# Patient Record
Sex: FEMALE | HISPANIC OR LATINO | Employment: FULL TIME | URBAN - METROPOLITAN AREA
[De-identification: names, ages, dates, MRNs, and addresses within clinical notes are randomized per-mention and may not be internally consistent; named-entity substitution may affect disease eponyms.]

---

## 2018-06-19 ENCOUNTER — HOSPITAL ENCOUNTER (OUTPATIENT)
Facility: MEDICAL CENTER | Age: 48
End: 2018-06-19
Attending: PHYSICIAN ASSISTANT
Payer: COMMERCIAL

## 2018-06-19 ENCOUNTER — OFFICE VISIT (OUTPATIENT)
Dept: MEDICAL GROUP | Facility: MEDICAL CENTER | Age: 48
End: 2018-06-19
Payer: COMMERCIAL

## 2018-06-19 VITALS
OXYGEN SATURATION: 97 % | DIASTOLIC BLOOD PRESSURE: 88 MMHG | HEIGHT: 59 IN | TEMPERATURE: 97.9 F | SYSTOLIC BLOOD PRESSURE: 140 MMHG | WEIGHT: 164.02 LBS | BODY MASS INDEX: 33.07 KG/M2 | HEART RATE: 77 BPM

## 2018-06-19 DIAGNOSIS — R31.0 GROSS HEMATURIA: ICD-10-CM

## 2018-06-19 DIAGNOSIS — E66.9 OBESITY (BMI 30-39.9): ICD-10-CM

## 2018-06-19 DIAGNOSIS — Z12.11 ENCOUNTER FOR FIT (FECAL IMMUNOCHEMICAL TEST) SCREENING: ICD-10-CM

## 2018-06-19 DIAGNOSIS — Z00.00 PREVENTATIVE HEALTH CARE: ICD-10-CM

## 2018-06-19 DIAGNOSIS — Z76.89 ENCOUNTER TO ESTABLISH CARE: ICD-10-CM

## 2018-06-19 LAB
APPEARANCE UR: CLEAR
BILIRUB UR STRIP-MCNC: NEGATIVE MG/DL
COLOR UR AUTO: YELLOW
GLUCOSE UR STRIP.AUTO-MCNC: NEGATIVE MG/DL
KETONES UR STRIP.AUTO-MCNC: NEGATIVE MG/DL
LEUKOCYTE ESTERASE UR QL STRIP.AUTO: 1
NITRITE UR QL STRIP.AUTO: NEGATIVE
PH UR STRIP.AUTO: 6 [PH] (ref 5–8)
PROT UR QL STRIP: NEGATIVE MG/DL
RBC UR QL AUTO: NEGATIVE
SP GR UR STRIP.AUTO: 1.02
UROBILINOGEN UR STRIP-MCNC: NEGATIVE MG/DL

## 2018-06-19 PROCEDURE — 87086 URINE CULTURE/COLONY COUNT: CPT

## 2018-06-19 PROCEDURE — 99204 OFFICE O/P NEW MOD 45 MIN: CPT | Performed by: PHYSICIAN ASSISTANT

## 2018-06-19 PROCEDURE — 81002 URINALYSIS NONAUTO W/O SCOPE: CPT | Performed by: PHYSICIAN ASSISTANT

## 2018-06-19 ASSESSMENT — PATIENT HEALTH QUESTIONNAIRE - PHQ9: CLINICAL INTERPRETATION OF PHQ2 SCORE: 0

## 2018-06-19 NOTE — PROGRESS NOTES
"Subjective:   Eugenia Byrd is a 48 y.o. female here today for gross hematuria ×2 times over the past 3 months.    Gross hematuria  This is a 48-year-old female who is sent here today by gastroenterology for having an abnormal urinalysis. Urine showed blood. Microscopic. Initially she saw GI for abdominal discomfort. She does complain of yomi blood in her urine twice over the past 3 months. Denies any dysuria or fevers or back pain. No history of smoking. Female organs are intact. Denies any weight loss. Denies any pelvic pain.       Current medicines (including changes today)  No current outpatient prescriptions on file.     No current facility-administered medications for this visit.      She  has no past medical history on file.    Social History and Family History were reviewed and updated.    ROS   No chest pain, no shortness of breath, no pelvic pain and all other systems were reviewed and are negative.       Objective:     Blood pressure 140/88, pulse 77, temperature 36.6 °C (97.9 °F), height 1.499 m (4' 11\"), weight 74.4 kg (164 lb 0.4 oz), SpO2 97 %. Body mass index is 33.13 kg/m².   Physical Exam:  Constitutional: Alert, no distress.  Skin: Warm, dry, good turgor, no rashes in visible areas.  Eye: Equal, round and reactive, conjunctiva clear, lids normal.  ENMT: Lips without lesions, good dentition, oropharynx clear.  Neck: Trachea midline, no masses.   Lymph: No cervical or supraclavicular lymphadenopathy  Respiratory: Unlabored respiratory effort, lungs clear to auscultation, no wheezes, no ronchi.  Cardiovascular: Normal S1, S2, no murmur, no edema.  Abdomen/pelvis: Soft, non-tender, no masses. No CVA tenderness.  Psych: Alert and oriented x3, normal affect and mood.        Assessment and Plan:   The following treatment plan was discussed    1. Gross hematuria  Acute, new onset condition. Unknown etiology. No risk factors for bladder cancer. Referred to urology to establish care. Urinalysis with 1+ " leukocytes. Negative for microscopic blood. We'll send off urine for culture. Advised to follow up in 2 months. At that point may have to do a Pap smear needed.  - POCT Urinalysis  - REFERRAL TO UROLOGY  - URINE CULTURE(NEW); Future    2. Obesity (BMI 30-39.9)  Chronic condition. We'll continue to monitor.  - Patient identified as having weight management issue.  Appropriate orders and counseling given.    3. Encounter for FIT (fecal immunochemical test) screening  Recent negative FIT testing. Had a colonoscopy in June 2010 which was within normal limits.    4. Preventative health care  Ordered labs fasting. She'll be contacted with the results.  - LIPID PROFILE; Future  - COMP METABOLIC PANEL; Future  - HEMOGLOBIN A1C; Future    5. Encounter to establish care      Followup: Return in about 2 months (around 8/19/2018), or if symptoms worsen or fail to improve.    Please note that this dictation was created using voice recognition software. I have made every reasonable attempt to correct obvious errors, but I expect that there are errors of grammar and possibly content that I did not discover before finalizing the note.

## 2018-06-19 NOTE — ASSESSMENT & PLAN NOTE
This is a 48-year-old female who is sent here today by gastroenterology for having an abnormal urinalysis. Urine showed blood. Microscopic. Initially she saw GI for abdominal discomfort. She does complain of yomi blood in her urine twice over the past 3 months. Denies any dysuria or fevers or back pain. No history of smoking. Female organs are intact. Denies any weight loss. Denies any pelvic pain.

## 2018-06-20 DIAGNOSIS — R31.0 GROSS HEMATURIA: ICD-10-CM

## 2018-06-22 ENCOUNTER — TELEPHONE (OUTPATIENT)
Dept: MEDICAL GROUP | Facility: MEDICAL CENTER | Age: 48
End: 2018-06-22

## 2018-06-22 LAB
BACTERIA UR CULT: NORMAL
SIGNIFICANT IND 70042: NORMAL
SITE SITE: NORMAL
SOURCE SOURCE: NORMAL

## 2018-06-22 NOTE — TELEPHONE ENCOUNTER
Phone Number Called: [phone] (home)      Message: Left message for patient to return our call so we can go over her lab results.     Left Message for patient to call back: yes

## 2018-06-22 NOTE — TELEPHONE ENCOUNTER
----- Message from Lior Colon P.A.-C. sent at 6/22/2018  7:04 AM PDT -----  Please contact. Urine did not show any bacterial infection. Follow-up with urology. Thank you.    Lior

## 2018-06-25 NOTE — TELEPHONE ENCOUNTER
Phone Number Called: [phone] (home)      Message: Left message for patient to call us back for lab results.     Left Message for patient to call back: yes

## 2018-06-27 NOTE — TELEPHONE ENCOUNTER
Phone Number Called: [phone] (home)      Message: Left message for patient to call us back for lab results.     Left Message for patient to call back: yes

## 2018-06-29 ENCOUNTER — TELEPHONE (OUTPATIENT)
Dept: MEDICAL GROUP | Facility: MEDICAL CENTER | Age: 48
End: 2018-06-29

## 2018-06-29 NOTE — TELEPHONE ENCOUNTER
1. Caller Name: Eugenia Chua                                         Call Back Number: [phone]      Patient approves a detailed voicemail message: yes    Pt called the office to report Urology has not received the referral as of yet. Notified Pt I would print her referral, face sheet and office visit and fax to [facility] now. Pt thankful for the help.

## 2018-06-30 ENCOUNTER — HOSPITAL ENCOUNTER (OUTPATIENT)
Dept: LAB | Facility: MEDICAL CENTER | Age: 48
End: 2018-06-30
Attending: PHYSICIAN ASSISTANT
Payer: COMMERCIAL

## 2018-06-30 DIAGNOSIS — Z00.00 PREVENTATIVE HEALTH CARE: ICD-10-CM

## 2018-06-30 LAB
ALBUMIN SERPL BCP-MCNC: 4 G/DL (ref 3.2–4.9)
ALBUMIN/GLOB SERPL: 1.3 G/DL
ALP SERPL-CCNC: 57 U/L (ref 30–99)
ALT SERPL-CCNC: 23 U/L (ref 2–50)
ANION GAP SERPL CALC-SCNC: 8 MMOL/L (ref 0–11.9)
AST SERPL-CCNC: 15 U/L (ref 12–45)
BILIRUB SERPL-MCNC: 0.4 MG/DL (ref 0.1–1.5)
BUN SERPL-MCNC: 16 MG/DL (ref 8–22)
CALCIUM SERPL-MCNC: 9 MG/DL (ref 8.5–10.5)
CHLORIDE SERPL-SCNC: 108 MMOL/L (ref 96–112)
CHOLEST SERPL-MCNC: 212 MG/DL (ref 100–199)
CO2 SERPL-SCNC: 25 MMOL/L (ref 20–33)
CREAT SERPL-MCNC: 0.5 MG/DL (ref 0.5–1.4)
EST. AVERAGE GLUCOSE BLD GHB EST-MCNC: 117 MG/DL
GLOBULIN SER CALC-MCNC: 3.2 G/DL (ref 1.9–3.5)
GLUCOSE SERPL-MCNC: 91 MG/DL (ref 65–99)
HBA1C MFR BLD: 5.7 % (ref 0–5.6)
HDLC SERPL-MCNC: 47 MG/DL
LDLC SERPL CALC-MCNC: 136 MG/DL
POTASSIUM SERPL-SCNC: 4 MMOL/L (ref 3.6–5.5)
PROT SERPL-MCNC: 7.2 G/DL (ref 6–8.2)
SODIUM SERPL-SCNC: 141 MMOL/L (ref 135–145)
TRIGL SERPL-MCNC: 145 MG/DL (ref 0–149)

## 2018-06-30 PROCEDURE — 80053 COMPREHEN METABOLIC PANEL: CPT

## 2018-06-30 PROCEDURE — 36415 COLL VENOUS BLD VENIPUNCTURE: CPT

## 2018-06-30 PROCEDURE — 83036 HEMOGLOBIN GLYCOSYLATED A1C: CPT

## 2018-06-30 PROCEDURE — 80061 LIPID PANEL: CPT

## 2018-07-01 PROBLEM — E78.00 PURE HYPERCHOLESTEROLEMIA: Status: ACTIVE | Noted: 2018-07-01

## 2018-07-02 ENCOUNTER — TELEPHONE (OUTPATIENT)
Dept: MEDICAL GROUP | Facility: MEDICAL CENTER | Age: 48
End: 2018-07-02

## 2018-07-02 NOTE — TELEPHONE ENCOUNTER
Phone Number Called: [phone] (home)       Message: Left msg for patient to call back in regards to labs.     Left Message for patient to call back: Yes

## 2018-07-02 NOTE — TELEPHONE ENCOUNTER
----- Message from Lior Colon P.A.-C. sent at 7/1/2018  3:29 PM PDT -----  Please contact Eugenia.  Only concern with labs were her cholesterol levels.  They are elevated.  LDL at 136 and should be below 100.  Continue to exercise routinely and watch fatty foods.  I'll see her in August to discuss repeating those labs or medication initiation.  Thank you.    Lior

## 2018-07-03 NOTE — TELEPHONE ENCOUNTER
Phone Number Called: [phone] (home)       Message: Left msg for patient to call back for labs     Left Message for patient to call back: yes

## 2018-07-05 NOTE — TELEPHONE ENCOUNTER
Phone Number Called: [phone] (home)       Message: Left vm for patient to call back. Will be mailing out letter today.     Left Message for patient to call back: yes

## 2018-08-17 ENCOUNTER — APPOINTMENT (OUTPATIENT)
Dept: MEDICAL GROUP | Facility: MEDICAL CENTER | Age: 48
End: 2018-08-17
Payer: COMMERCIAL

## 2018-08-24 ENCOUNTER — HOSPITAL ENCOUNTER (OUTPATIENT)
Dept: RADIOLOGY | Facility: MEDICAL CENTER | Age: 48
End: 2018-08-24

## 2018-10-17 ENCOUNTER — HOSPITAL ENCOUNTER (OUTPATIENT)
Facility: MEDICAL CENTER | Age: 48
End: 2018-10-17
Attending: UROLOGY | Admitting: UROLOGY
Payer: COMMERCIAL

## 2018-10-17 ENCOUNTER — APPOINTMENT (OUTPATIENT)
Dept: RADIOLOGY | Facility: MEDICAL CENTER | Age: 48
End: 2018-10-17
Attending: UROLOGY
Payer: COMMERCIAL

## 2018-10-17 VITALS
WEIGHT: 163.8 LBS | HEART RATE: 94 BPM | BODY MASS INDEX: 32.16 KG/M2 | TEMPERATURE: 98.1 F | OXYGEN SATURATION: 95 % | RESPIRATION RATE: 14 BRPM | SYSTOLIC BLOOD PRESSURE: 140 MMHG | HEIGHT: 60 IN | DIASTOLIC BLOOD PRESSURE: 79 MMHG

## 2018-10-17 DIAGNOSIS — N20.0 CALCULUS OF KIDNEY: ICD-10-CM

## 2018-10-17 LAB
ANION GAP SERPL CALC-SCNC: 9 MMOL/L (ref 0–11.9)
APPEARANCE UR: CLEAR
BACTERIA #/AREA URNS HPF: ABNORMAL /HPF
BILIRUB UR QL STRIP.AUTO: NEGATIVE
BUN SERPL-MCNC: 8 MG/DL (ref 8–22)
CALCIUM SERPL-MCNC: 9.8 MG/DL (ref 8.5–10.5)
CHLORIDE SERPL-SCNC: 107 MMOL/L (ref 96–112)
CO2 SERPL-SCNC: 26 MMOL/L (ref 20–33)
COLOR UR: YELLOW
CREAT SERPL-MCNC: 0.55 MG/DL (ref 0.5–1.4)
EPI CELLS #/AREA URNS HPF: ABNORMAL /HPF
ERYTHROCYTE [DISTWIDTH] IN BLOOD BY AUTOMATED COUNT: 42.9 FL (ref 35.9–50)
GLUCOSE SERPL-MCNC: 107 MG/DL (ref 65–99)
GLUCOSE UR STRIP.AUTO-MCNC: NEGATIVE MG/DL
HCG UR QL: NEGATIVE
HCT VFR BLD AUTO: 43.4 % (ref 37–47)
HGB BLD-MCNC: 14.3 G/DL (ref 12–16)
INR PPP: 0.88 (ref 0.87–1.13)
KETONES UR STRIP.AUTO-MCNC: NEGATIVE MG/DL
LEUKOCYTE ESTERASE UR QL STRIP.AUTO: ABNORMAL
MCH RBC QN AUTO: 28.4 PG (ref 27–33)
MCHC RBC AUTO-ENTMCNC: 32.9 G/DL (ref 33.6–35)
MCV RBC AUTO: 86.1 FL (ref 81.4–97.8)
MICRO URNS: ABNORMAL
MUCOUS THREADS #/AREA URNS HPF: ABNORMAL /HPF
NITRITE UR QL STRIP.AUTO: NEGATIVE
PH UR STRIP.AUTO: 7 [PH]
PLATELET # BLD AUTO: 312 K/UL (ref 164–446)
PMV BLD AUTO: 11.5 FL (ref 9–12.9)
POTASSIUM SERPL-SCNC: 3.5 MMOL/L (ref 3.6–5.5)
PROT UR QL STRIP: 30 MG/DL
PROTHROMBIN TIME: 12 SEC (ref 12–14.6)
RBC # BLD AUTO: 5.04 M/UL (ref 4.2–5.4)
RBC # URNS HPF: ABNORMAL /HPF
RBC UR QL AUTO: ABNORMAL
SODIUM SERPL-SCNC: 142 MMOL/L (ref 135–145)
SP GR UR REFRACTOMETRY: 1.02
SP GR UR STRIP.AUTO: 1.02
UROBILINOGEN UR STRIP.AUTO-MCNC: 0.2 MG/DL
WBC # BLD AUTO: 11.9 K/UL (ref 4.8–10.8)
WBC #/AREA URNS HPF: ABNORMAL /HPF

## 2018-10-17 PROCEDURE — C1769 GUIDE WIRE: HCPCS | Performed by: UROLOGY

## 2018-10-17 PROCEDURE — 87086 URINE CULTURE/COLONY COUNT: CPT

## 2018-10-17 PROCEDURE — 87186 SC STD MICRODIL/AGAR DIL: CPT

## 2018-10-17 PROCEDURE — 160035 HCHG PACU - 1ST 60 MINS PHASE I: Performed by: UROLOGY

## 2018-10-17 PROCEDURE — 160048 HCHG OR STATISTICAL LEVEL 1-5: Performed by: UROLOGY

## 2018-10-17 PROCEDURE — A9270 NON-COVERED ITEM OR SERVICE: HCPCS | Performed by: ANESTHESIOLOGY

## 2018-10-17 PROCEDURE — 700111 HCHG RX REV CODE 636 W/ 250 OVERRIDE (IP): Performed by: ANESTHESIOLOGY

## 2018-10-17 PROCEDURE — 160046 HCHG PACU - 1ST 60 MINS PHASE II: Performed by: UROLOGY

## 2018-10-17 PROCEDURE — 81001 URINALYSIS AUTO W/SCOPE: CPT

## 2018-10-17 PROCEDURE — 160029 HCHG SURGERY MINUTES - 1ST 30 MINS LEVEL 4: Performed by: UROLOGY

## 2018-10-17 PROCEDURE — 160041 HCHG SURGERY MINUTES - EA ADDL 1 MIN LEVEL 4: Performed by: UROLOGY

## 2018-10-17 PROCEDURE — 160025 RECOVERY II MINUTES (STATS): Performed by: UROLOGY

## 2018-10-17 PROCEDURE — C1758 CATHETER, URETERAL: HCPCS | Performed by: UROLOGY

## 2018-10-17 PROCEDURE — 500440 HCHG DRESSING, STERILE ROLL (KERLIX): Performed by: UROLOGY

## 2018-10-17 PROCEDURE — 500191 HCHG CATH, COUNCIL TIP 24FR (NEPH): Performed by: UROLOGY

## 2018-10-17 PROCEDURE — 99153 MOD SED SAME PHYS/QHP EA: CPT

## 2018-10-17 PROCEDURE — 700101 HCHG RX REV CODE 250

## 2018-10-17 PROCEDURE — 501161 HCHG PROBE, SWISS LITHOCLAST: Performed by: UROLOGY

## 2018-10-17 PROCEDURE — 80048 BASIC METABOLIC PNL TOTAL CA: CPT

## 2018-10-17 PROCEDURE — 501329 HCHG SET, CYSTO IRRIG Y TUR: Performed by: UROLOGY

## 2018-10-17 PROCEDURE — 88300 SURGICAL PATH GROSS: CPT

## 2018-10-17 PROCEDURE — C1729 CATH, DRAINAGE: HCPCS | Performed by: UROLOGY

## 2018-10-17 PROCEDURE — 700102 HCHG RX REV CODE 250 W/ 637 OVERRIDE(OP): Performed by: ANESTHESIOLOGY

## 2018-10-17 PROCEDURE — 160009 HCHG ANES TIME/MIN: Performed by: UROLOGY

## 2018-10-17 PROCEDURE — 700111 HCHG RX REV CODE 636 W/ 250 OVERRIDE (IP): Performed by: UROLOGY

## 2018-10-17 PROCEDURE — 81025 URINE PREGNANCY TEST: CPT

## 2018-10-17 PROCEDURE — 160002 HCHG RECOVERY MINUTES (STAT)

## 2018-10-17 PROCEDURE — 500042 HCHG BAG, URINARY DRAINAGE (CLOSED): Performed by: UROLOGY

## 2018-10-17 PROCEDURE — 700111 HCHG RX REV CODE 636 W/ 250 OVERRIDE (IP)

## 2018-10-17 PROCEDURE — 85027 COMPLETE CBC AUTOMATED: CPT

## 2018-10-17 PROCEDURE — 501838 HCHG SUTURE GENERAL: Performed by: UROLOGY

## 2018-10-17 PROCEDURE — 700111 HCHG RX REV CODE 636 W/ 250 OVERRIDE (IP): Performed by: RADIOLOGY

## 2018-10-17 PROCEDURE — 87077 CULTURE AEROBIC IDENTIFY: CPT

## 2018-10-17 PROCEDURE — 160002 HCHG RECOVERY MINUTES (STAT): Performed by: UROLOGY

## 2018-10-17 PROCEDURE — 160036 HCHG PACU - EA ADDL 30 MINS PHASE I: Performed by: UROLOGY

## 2018-10-17 PROCEDURE — 85610 PROTHROMBIN TIME: CPT

## 2018-10-17 PROCEDURE — 82365 CALCULUS SPECTROSCOPY: CPT

## 2018-10-17 RX ORDER — CEFAZOLIN SODIUM 1 G/3ML
INJECTION, POWDER, FOR SOLUTION INTRAMUSCULAR; INTRAVENOUS
Status: COMPLETED
Start: 2018-10-17 | End: 2018-10-17

## 2018-10-17 RX ORDER — SODIUM CHLORIDE 9 MG/ML
500 INJECTION, SOLUTION INTRAVENOUS
Status: DISCONTINUED | OUTPATIENT
Start: 2018-10-17 | End: 2018-10-17 | Stop reason: HOSPADM

## 2018-10-17 RX ORDER — DIPHENHYDRAMINE HYDROCHLORIDE 50 MG/ML
12.5 INJECTION INTRAMUSCULAR; INTRAVENOUS
Status: DISCONTINUED | OUTPATIENT
Start: 2018-10-17 | End: 2018-10-17 | Stop reason: HOSPADM

## 2018-10-17 RX ORDER — MIDAZOLAM HYDROCHLORIDE 1 MG/ML
INJECTION INTRAMUSCULAR; INTRAVENOUS
Status: COMPLETED
Start: 2018-10-17 | End: 2018-10-17

## 2018-10-17 RX ORDER — LABETALOL HYDROCHLORIDE 5 MG/ML
5 INJECTION, SOLUTION INTRAVENOUS
Status: DISCONTINUED | OUTPATIENT
Start: 2018-10-17 | End: 2018-10-17 | Stop reason: HOSPADM

## 2018-10-17 RX ORDER — HYDROMORPHONE HYDROCHLORIDE 2 MG/ML
INJECTION, SOLUTION INTRAMUSCULAR; INTRAVENOUS; SUBCUTANEOUS
Status: COMPLETED
Start: 2018-10-17 | End: 2018-10-17

## 2018-10-17 RX ORDER — OXYCODONE HCL 5 MG/5 ML
5 SOLUTION, ORAL ORAL
Status: COMPLETED | OUTPATIENT
Start: 2018-10-17 | End: 2018-10-17

## 2018-10-17 RX ORDER — ONDANSETRON 2 MG/ML
4 INJECTION INTRAMUSCULAR; INTRAVENOUS
Status: DISCONTINUED | OUTPATIENT
Start: 2018-10-17 | End: 2018-10-17 | Stop reason: HOSPADM

## 2018-10-17 RX ORDER — SODIUM CHLORIDE 9 MG/ML
1000 INJECTION, SOLUTION INTRAVENOUS
Status: COMPLETED | OUTPATIENT
Start: 2018-10-17 | End: 2018-10-17

## 2018-10-17 RX ORDER — MIDAZOLAM HYDROCHLORIDE 1 MG/ML
.5-2 INJECTION INTRAMUSCULAR; INTRAVENOUS PRN
Status: DISCONTINUED | OUTPATIENT
Start: 2018-10-17 | End: 2018-10-17 | Stop reason: HOSPADM

## 2018-10-17 RX ORDER — OXYCODONE HCL 5 MG/5 ML
10 SOLUTION, ORAL ORAL
Status: COMPLETED | OUTPATIENT
Start: 2018-10-17 | End: 2018-10-17

## 2018-10-17 RX ORDER — HALOPERIDOL 5 MG/ML
1 INJECTION INTRAMUSCULAR
Status: DISCONTINUED | OUTPATIENT
Start: 2018-10-17 | End: 2018-10-17 | Stop reason: HOSPADM

## 2018-10-17 RX ORDER — OXYCODONE HYDROCHLORIDE 5 MG/1
5 TABLET ORAL
Status: DISCONTINUED | OUTPATIENT
Start: 2018-10-17 | End: 2018-10-17 | Stop reason: HOSPADM

## 2018-10-17 RX ORDER — SODIUM CHLORIDE, SODIUM LACTATE, POTASSIUM CHLORIDE, CALCIUM CHLORIDE 600; 310; 30; 20 MG/100ML; MG/100ML; MG/100ML; MG/100ML
INJECTION, SOLUTION INTRAVENOUS CONTINUOUS
Status: DISCONTINUED | OUTPATIENT
Start: 2018-10-17 | End: 2018-10-17 | Stop reason: HOSPADM

## 2018-10-17 RX ORDER — ONDANSETRON 2 MG/ML
4 INJECTION INTRAMUSCULAR; INTRAVENOUS PRN
Status: DISCONTINUED | OUTPATIENT
Start: 2018-10-17 | End: 2018-10-17 | Stop reason: HOSPADM

## 2018-10-17 RX ORDER — CEFAZOLIN SODIUM 2 G/100ML
2 INJECTION, SOLUTION INTRAVENOUS ONCE
Status: COMPLETED | OUTPATIENT
Start: 2018-10-17 | End: 2018-10-17

## 2018-10-17 RX ORDER — OXYCODONE HYDROCHLORIDE 5 MG/1
10 TABLET ORAL
Status: DISCONTINUED | OUTPATIENT
Start: 2018-10-17 | End: 2018-10-17 | Stop reason: HOSPADM

## 2018-10-17 RX ADMIN — MIDAZOLAM HYDROCHLORIDE 2 MG: 1 INJECTION INTRAMUSCULAR; INTRAVENOUS at 09:00

## 2018-10-17 RX ADMIN — SODIUM CHLORIDE 1000 ML: 9 INJECTION, SOLUTION INTRAVENOUS at 07:40

## 2018-10-17 RX ADMIN — CEFAZOLIN 2000 MG: 330 INJECTION, POWDER, FOR SOLUTION INTRAMUSCULAR; INTRAVENOUS at 08:56

## 2018-10-17 RX ADMIN — MIDAZOLAM HYDROCHLORIDE 2 MG: 1 INJECTION INTRAMUSCULAR; INTRAVENOUS at 08:51

## 2018-10-17 RX ADMIN — LIDOCAINE HYDROCHLORIDE 0.5 ML: 10 INJECTION, SOLUTION EPIDURAL; INFILTRATION; INTRACAUDAL; PERINEURAL at 07:40

## 2018-10-17 RX ADMIN — FENTANYL CITRATE 50 MCG: 50 INJECTION, SOLUTION INTRAMUSCULAR; INTRAVENOUS at 08:51

## 2018-10-17 RX ADMIN — FENTANYL CITRATE 50 MCG: 50 INJECTION, SOLUTION INTRAMUSCULAR; INTRAVENOUS at 10:00

## 2018-10-17 RX ADMIN — MIDAZOLAM 2 MG: 1 INJECTION INTRAMUSCULAR; INTRAVENOUS at 08:51

## 2018-10-17 RX ADMIN — OXYCODONE HYDROCHLORIDE 10 MG: 5 SOLUTION ORAL at 18:54

## 2018-10-17 RX ADMIN — HYDROMORPHONE HYDROCHLORIDE 0.5 MG: 2 INJECTION INTRAMUSCULAR; INTRAVENOUS; SUBCUTANEOUS at 11:00

## 2018-10-17 ASSESSMENT — PAIN SCALES - GENERAL
PAINLEVEL_OUTOF10: 0
PAINLEVEL_OUTOF10: 6
PAINLEVEL_OUTOF10: 0
PAINLEVEL_OUTOF10: 0
PAINLEVEL_OUTOF10: 7
PAINLEVEL_OUTOF10: 4
PAINLEVEL_OUTOF10: 0
PAINLEVEL_OUTOF10: 4
PAINLEVEL_OUTOF10: 7
PAINLEVEL_OUTOF10: 7

## 2018-10-17 NOTE — PROGRESS NOTES
Med rec updated and complete  Allergies reviewed  Interviewed pt with family at bedside with permission from pt.  Pt reports no prescription medications, OTC's, or vitamins.  Pt reports no antibiotics in the last 30 days.

## 2018-10-17 NOTE — OR SURGEON
Immediate Post- Operative Note        PostOp Diagnosis: LEFT staghorn calculus      Procedure(s): LEFT pcnu      Estimated Blood Loss: Less than 5 ml        Complications: None            10/17/2018     9:19 AM     Chad Pires

## 2018-10-17 NOTE — PROGRESS NOTES
Patient in phase 2 after IR procedure, awaiting surgery at 1400. Patient drowsy but easily arousable,  patient and family at bedside updated on plan of care, all questions answered, no further needs at this time, call light within reach.

## 2018-10-17 NOTE — OR NURSING
0940: rec'd pt from IR, pt denies pain or other s/s distress. Gauze drsg to left flank area c,d,i. Lungs clear all lobes. Pt voided approx 150 ml blood tinged urine into female urinal. Vss. 1003: updated pt's family member. Pt treated for mild c/o pain. 1015: pt sleeping intermittently s/p pain med admin. 1049: message left for IR physician to call back for possible pain med orders. Pt pain level 7.

## 2018-10-17 NOTE — PROGRESS NOTES
IR Procedure RN's Note:    Patient consented in TPre-op by Dr. Pires; all paperwork including anesthesia case signed and updated; verified by COLE Aburto.    LEFT nephrostomy tube placement done by Dr. Pires; posterior  LEFT flank access site; pt assessed upon arrival, pt A/Ox4, pt aware of the procedure, pt baseline - anxious; catheter in place for access in OR ; pt appears comfortable throughout the procedure with no signs of distress or discomfort; ETCO2 monitored with a baseline of 34mmHg and ranged between 24-39mmHg throughout the procedure; access site CDI, soft with no signs of hematoma or bleeding, gauze and tegaderm for dressing; telephone report given to TPACU RN; pt is awake and on 2L NC O2 post procedure and during transport; pt transported to [facility].

## 2018-10-18 LAB — PATHOLOGY CONSULT NOTE: NORMAL

## 2018-10-18 NOTE — DISCHARGE INSTRUCTIONS
ACTIVITY: Rest and take it easy for the first 24 hours.  A responsible adult is recommended to remain with you during that time.  It is normal to feel sleepy.  We encourage you to not do anything that requires balance, judgment or coordination.    MILD FLU-LIKE SYMPTOMS ARE NORMAL. YOU MAY EXPERIENCE GENERALIZED MUSCLE ACHES, THROAT IRRITATION, HEADACHE AND/OR SOME NAUSEA.    FOR 24 HOURS DO NOT:  Drive, operate machinery or run household appliances.  Drink beer or alcoholic beverages.   Make important decisions or sign legal documents.    SPECIAL INSTRUCTIONS:     Diet: Clears Advance diet as tolerated to regular diet.   Follow-up with Dr. Brown next week.   Teach nehrostomy tube care: Drainage of the bag.   Keep wound clean and dry for 24 hours then can shower.    Percutaneous Nephrostomy, Care After  Refer to this sheet in the next few weeks. These instructions provide you with information on caring for yourself after your procedure. Your health care provider may also give you more specific instructions. Your treatment has been planned according to current medical practices, but problems sometimes occur. Call your health care provider if you have any problems or questions after your procedure.  WHAT TO EXPECT AFTER THE PROCEDURE  You will need to remain lying down for several hours.  HOME CARE INSTRUCTIONS  · Your nephrostomy tube is connected to a leg bag or bedside drainage bag. Always keep the tubing, the leg bag, or the bedside drainage bags below the level of the kidney so that the urine drains freely.  · During the day, if you are connecting the nephrostomy tube to a leg bag, be sure there are no kinks in the tubing and that the urine is draining freely.  · At night, you may want to connect the nephrostomy tube or the leg bag to a larger bedside drainage bag.  · Change the dressing as often as directed by your health care provider, or if it becomes wet.  ¨ Gently remove the tapes and dressing from  around the nephrostomy tube. Be careful not to pull on the tube while removing the dressing.  ¨ Wash the skin around the tube, rinse well, and dry.  ¨ Place two split drain sponges in and around the tube exit site.  ¨ Place tape around edge of the dressing.  ¨ Secure the nephrostomy tubing. Remember to make certain that the nephrostomy tube does not kink or become pinched closed. It can be useful to wrap any exposed tubing going from the nephrostomy tube to any of the connecting tubes to either the leg bag or drainage bag with an elastic bandage.  · Every three weeks, replace the leg bag, drainage bag, and any extension tubing connected to your nephrostomy tube. Your health care provider will explain how to change the drainage bag and extension tubing.  SEEK MEDICAL CARE IF:  · You experience any problems with any of the valves or tubing.  · You have persistent pain or soreness in your back.  · You have a fever or chills.  SEEK IMMEDIATE MEDICAL CARE IF:  · You have abdominal pain during the first week.  · You have a new appearance of blood in your urine.  · You have back pain that is not relieved by your medicine.  · You have drainage, redness, swelling, or pain at the tube insertion site.  · You have decreased urine output.  · Your nephrostomy tube comes out.     This information is not intended to replace advice given to you by your health care provider. Make sure you discuss any questions you have with your health care provider.    FOLLOW-UP APPOINTMENT:  A follow-up appointment should be arranged with your doctor in 1 week; call to schedule.    You should CALL YOUR PHYSICIAN if you develop:  Fever greater than 101 degrees F.  Pain not relieved by medication, or persistent nausea or vomiting.  Excessive bleeding (blood soaking through dressing) or unexpected drainage from the wound.  Extreme redness or swelling around the incision site, drainage of pus or foul smelling drainage.  Inability to urinate or empty  your bladder within 8 hours.  Problems with breathing or chest pain.    You should call 911 if you develop problems with breathing or chest pain.  If you are unable to contact your doctor or surgical center, you should go to the nearest emergency room or urgent care center.  Physician's telephone #: Dr. Brown [phone]    If any questions arise, call your doctor.  If your doctor is not available, please feel free to call the Surgical Center at [phone].  The Center is open Monday through Friday from 7AM to 7PM.  You can also call the [employer] HOTLINE open 24 hours/day, 7 days/week and speak to a nurse at [phone], or toll free at [phone].    A registered nurse may call you a few days after your surgery to see how you are doing after your procedure.    MEDICATIONS: Resume taking daily medication.  Take prescribed pain medication with food.  If no medication is prescribed, you may take non-aspirin pain medication if needed.  PAIN MEDICATION CAN BE VERY CONSTIPATING.  Take a stool softener or laxative such as senokot, pericolace, or milk of magnesia if needed.    Prescription given for Norco (Pain).  Last pain medication given at 6:54pm. Next pain medication dose can be given between 10:54pm & 12:54am.  Prescription given for Bactrim DS (Antibiotic).    If your physician has prescribed pain medication that includes Acetaminophen (Tylenol), do not take additional Acetaminophen (Tylenol) while taking the prescribed medication.    Depression / Suicide Risk    As you are discharged from this [facility] facility, it is important to learn how to keep safe from harming yourself.    Recognize the warning signs:  · Abrupt changes in personality, positive or negative- including increase in energy   · Giving away possessions  · Change in eating patterns- significant weight changes-  positive or negative  · Change in sleeping patterns- unable to sleep or sleeping all the time   · Unwillingness or inability to  communicate  · Depression  · Unusual sadness, discouragement and loneliness  · Talk of wanting to die  · Neglect of personal appearance   · Rebelliousness- reckless behavior  · Withdrawal from people/activities they love  · Confusion- inability to concentrate     If you or a loved one observes any of these behaviors or has concerns about self-harm, here's what you can do:  · Talk about it- your feelings and reasons for harming yourself  · Remove any means that you might use to hurt yourself (examples: pills, rope, extension cords, firearm)  · Get professional help from the community (Mental Health, Substance Abuse, psychological counseling)  · Do not be alone:Call your Safe Contact- someone whom you trust who will be there for you.  · Call your local CRISIS HOTLINE 360-0652 or 305-119-0827  · Call your local Children's Mobile Crisis Response Team [place] (494) 248-5076 or www.[employer]  · Call the toll free National Suicide Prevention Hotlines   · National Suicide Prevention Lifeline 000-138-VGZV (7760)  · National Hope Line Network 800-SUICIDE (413-5959)

## 2018-10-18 NOTE — OR SURGEON
Immediate Post OP Note    PreOp Diagnosis: Left Staghorn Calculus    PostOp Diagnosis: As above    Procedure(s):  LEFT PERCUTANEOUS TRACT DILATION  PERCUTANEOUS NEPHROSTOLITHOTRIPSY  - Wound Class: Clean Contaminated  LEFT NEPHROSTOMY TUBE    Surgeon(s):  Manas Brown M.D.    Anesthesiologist/Type of Anesthesia:  Anesthesiologist: Rocky Elliott M.D./General LMA    Surgical Staff:  Circulator: Annetta Alicia R.N.  Relief Circulator: Ivet Enamorado R.N.  Scrub Person: Yolis Monterroso  Radiology Technologist: Frieda Schwartz    Specimens removed if any:  ID Type Source Tests Collected by Time Destination   A : LEFT RENAL STONE Other Other PATHOLOGY SPECIMEN Manas Brown M.D. 10/17/2018  5:54 PM        Estimated Blood Loss: 200ml    Findings: Large staghorn calculus. Unable to get to upper lateral calyx and medial calyceal stone    Complications: None  Drains: 20 [place] catheter as nephrostomy tube        10/17/2018 6:08 PM Manas Brown M.D.

## 2018-10-18 NOTE — OR NURSING
Patient arrived in phase 2 reporting 0/10 pain. Patient is not experiencing any nausea and is tolerating fluids. She is motivated for discharge.    2000 Patient's pain level is increased to 4/10. Family at bedside. Reviewed discharge instructions including prescriptions for ABX and Pain. Discharged patient via wheelchair with all belongings, escorted by CNA and family.

## 2018-10-20 LAB
BACTERIA UR CULT: ABNORMAL
BACTERIA UR CULT: ABNORMAL
SIGNIFICANT IND 70042: ABNORMAL
SITE SITE: ABNORMAL
SOURCE SOURCE: ABNORMAL

## 2018-10-23 LAB
APPEARANCE STONE: NORMAL
COMPN STONE: NORMAL
NUMBER STONE: NORMAL
SIZE STONE: NORMAL MM
SPECIMEN WT: 124 MG

## 2018-10-25 ENCOUNTER — APPOINTMENT (OUTPATIENT)
Dept: RADIOLOGY | Facility: MEDICAL CENTER | Age: 48
End: 2018-10-25
Attending: UROLOGY
Payer: COMMERCIAL

## 2018-10-25 ENCOUNTER — HOSPITAL ENCOUNTER (OUTPATIENT)
Facility: MEDICAL CENTER | Age: 48
End: 2018-10-25
Attending: UROLOGY | Admitting: UROLOGY
Payer: COMMERCIAL

## 2018-10-25 VITALS
HEIGHT: 65 IN | BODY MASS INDEX: 27.03 KG/M2 | RESPIRATION RATE: 20 BRPM | TEMPERATURE: 98 F | DIASTOLIC BLOOD PRESSURE: 86 MMHG | HEART RATE: 88 BPM | SYSTOLIC BLOOD PRESSURE: 152 MMHG | WEIGHT: 162.26 LBS | OXYGEN SATURATION: 96 %

## 2018-10-25 PROCEDURE — 700102 HCHG RX REV CODE 250 W/ 637 OVERRIDE(OP): Performed by: ANESTHESIOLOGY

## 2018-10-25 PROCEDURE — C1758 CATHETER, URETERAL: HCPCS | Performed by: UROLOGY

## 2018-10-25 PROCEDURE — 700101 HCHG RX REV CODE 250

## 2018-10-25 PROCEDURE — 500879 HCHG PACK, CYSTO: Performed by: UROLOGY

## 2018-10-25 PROCEDURE — 501329 HCHG SET, CYSTO IRRIG Y TUR: Performed by: UROLOGY

## 2018-10-25 PROCEDURE — 160036 HCHG PACU - EA ADDL 30 MINS PHASE I: Performed by: UROLOGY

## 2018-10-25 PROCEDURE — 160035 HCHG PACU - 1ST 60 MINS PHASE I: Performed by: UROLOGY

## 2018-10-25 PROCEDURE — 160048 HCHG OR STATISTICAL LEVEL 1-5: Performed by: UROLOGY

## 2018-10-25 PROCEDURE — C1769 GUIDE WIRE: HCPCS | Performed by: UROLOGY

## 2018-10-25 PROCEDURE — 160041 HCHG SURGERY MINUTES - EA ADDL 1 MIN LEVEL 4: Performed by: UROLOGY

## 2018-10-25 PROCEDURE — C2617 STENT, NON-COR, TEM W/O DEL: HCPCS | Performed by: UROLOGY

## 2018-10-25 PROCEDURE — 160046 HCHG PACU - 1ST 60 MINS PHASE II: Performed by: UROLOGY

## 2018-10-25 PROCEDURE — 160009 HCHG ANES TIME/MIN: Performed by: UROLOGY

## 2018-10-25 PROCEDURE — A9270 NON-COVERED ITEM OR SERVICE: HCPCS | Performed by: ANESTHESIOLOGY

## 2018-10-25 PROCEDURE — 700111 HCHG RX REV CODE 636 W/ 250 OVERRIDE (IP)

## 2018-10-25 PROCEDURE — 160029 HCHG SURGERY MINUTES - 1ST 30 MINS LEVEL 4: Performed by: UROLOGY

## 2018-10-25 PROCEDURE — 160025 RECOVERY II MINUTES (STATS): Performed by: UROLOGY

## 2018-10-25 PROCEDURE — 160002 HCHG RECOVERY MINUTES (STAT): Performed by: UROLOGY

## 2018-10-25 DEVICE — STENT UROLOGICAL POLARIS 6X24  ULTRA: Type: IMPLANTABLE DEVICE | Site: URETER | Status: FUNCTIONAL

## 2018-10-25 RX ORDER — ONDANSETRON 2 MG/ML
4 INJECTION INTRAMUSCULAR; INTRAVENOUS
Status: DISCONTINUED | OUTPATIENT
Start: 2018-10-25 | End: 2018-10-25 | Stop reason: HOSPADM

## 2018-10-25 RX ORDER — HALOPERIDOL 5 MG/ML
1 INJECTION INTRAMUSCULAR
Status: DISCONTINUED | OUTPATIENT
Start: 2018-10-25 | End: 2018-10-25 | Stop reason: HOSPADM

## 2018-10-25 RX ORDER — OXYCODONE HYDROCHLORIDE 5 MG/1
5 TABLET ORAL
Status: DISCONTINUED | OUTPATIENT
Start: 2018-10-25 | End: 2018-10-25 | Stop reason: HOSPADM

## 2018-10-25 RX ORDER — OXYCODONE HYDROCHLORIDE 5 MG/1
10 TABLET ORAL
Status: DISCONTINUED | OUTPATIENT
Start: 2018-10-25 | End: 2018-10-25 | Stop reason: HOSPADM

## 2018-10-25 RX ORDER — OXYCODONE HCL 5 MG/5 ML
5 SOLUTION, ORAL ORAL
Status: COMPLETED | OUTPATIENT
Start: 2018-10-25 | End: 2018-10-25

## 2018-10-25 RX ORDER — MEPERIDINE HYDROCHLORIDE 25 MG/ML
12.5 INJECTION INTRAMUSCULAR; INTRAVENOUS; SUBCUTANEOUS
Status: DISCONTINUED | OUTPATIENT
Start: 2018-10-25 | End: 2018-10-25 | Stop reason: HOSPADM

## 2018-10-25 RX ORDER — LABETALOL HYDROCHLORIDE 5 MG/ML
5 INJECTION, SOLUTION INTRAVENOUS
Status: DISCONTINUED | OUTPATIENT
Start: 2018-10-25 | End: 2018-10-25 | Stop reason: HOSPADM

## 2018-10-25 RX ORDER — SODIUM CHLORIDE, SODIUM LACTATE, POTASSIUM CHLORIDE, CALCIUM CHLORIDE 600; 310; 30; 20 MG/100ML; MG/100ML; MG/100ML; MG/100ML
INJECTION, SOLUTION INTRAVENOUS ONCE
Status: COMPLETED | OUTPATIENT
Start: 2018-10-25 | End: 2018-10-25

## 2018-10-25 RX ORDER — LIDOCAINE HYDROCHLORIDE 10 MG/ML
INJECTION, SOLUTION INFILTRATION; PERINEURAL
Status: COMPLETED
Start: 2018-10-25 | End: 2018-10-25

## 2018-10-25 RX ORDER — OXYCODONE HCL 5 MG/5 ML
10 SOLUTION, ORAL ORAL
Status: COMPLETED | OUTPATIENT
Start: 2018-10-25 | End: 2018-10-25

## 2018-10-25 RX ADMIN — SODIUM CHLORIDE, SODIUM LACTATE, POTASSIUM CHLORIDE, CALCIUM CHLORIDE: 600; 310; 30; 20 INJECTION, SOLUTION INTRAVENOUS at 11:53

## 2018-10-25 RX ADMIN — OXYCODONE HYDROCHLORIDE 5 MG: 5 SOLUTION ORAL at 15:35

## 2018-10-25 RX ADMIN — LIDOCAINE HYDROCHLORIDE 0.5 ML: 10 INJECTION, SOLUTION INFILTRATION; PERINEURAL at 11:53

## 2018-10-25 RX ADMIN — Medication 0.5 ML: at 11:53

## 2018-10-25 ASSESSMENT — PAIN SCALES - GENERAL
PAINLEVEL_OUTOF10: 2
PAINLEVEL_OUTOF10: 0
PAINLEVEL_OUTOF10: 2
PAINLEVEL_OUTOF10: 4
PAINLEVEL_OUTOF10: 3
PAINLEVEL_OUTOF10: 2

## 2018-10-25 NOTE — OR SURGEON
Immediate Post OP Note    PreOp Diagnosis: Left nephrolithiasis                                Indwelling left nephrostomy tube                                Status post left PCNL    PostOp Diagnosis: As above    Procedure(s):  CYSTOSCOPY-Wound Class: Clean Contaminated  LEFT FLEXIBLE URETEROSCOPY WITH LASER LITHOTRIPSY OF UPPER AND MID POLE CALYCEAL STONES- Wound Class: Clean Contaminated  LEFT STENT PLACEMENT- Wound Class: Clean Contaminated  REMOVAL OF THE LEFT NEPHROSTOMY TUBE    Surgeon(s):  Manas Brown M.D.    Anesthesiologist/Type of Anesthesia:  Anesthesiologist: Lebron Rand M.D./General LMA    Surgical Staff:  Circulator: Annetta Alicia R.N.  Laser Staff: Roscoe Barry  Scrub Person: Zoraida Nguyen    Specimens removed if any:  None    Estimated Blood Loss: 15ml    Findings: Stone fragmented to small pieces and powder    Complications: None  Drains: 6 [place] x 24 cm stent in the left ureter        10/25/2018 2:54 PM Manas Brown M.D.

## 2018-10-25 NOTE — OR NURSING
Pt c/o pain 3/10, tolerable at this time. Denies nausea. Reviewed d/c instructions and pain medication education with patient and family, verbalized understanding. D/C stable via w/c by CNA into care of family.

## 2018-10-25 NOTE — PROGRESS NOTES
Med rec partially complete per pt and family at bedside  Interviewed pt with family at bedside with permission from pt  Allergies reviewed and updated.

## 2018-10-25 NOTE — DISCHARGE INSTRUCTIONS
ACTIVITY: Rest and take it easy for the first 24 hours.  A responsible adult is recommended to remain with you during that time.  It is normal to feel sleepy.  We encourage you to not do anything that requires balance, judgment or coordination.    MILD FLU-LIKE SYMPTOMS ARE NORMAL. YOU MAY EXPERIENCE GENERALIZED MUSCLE ACHES, THROAT IRRITATION, HEADACHE AND/OR SOME NAUSEA.    FOR 24 HOURS DO NOT:  Drive, operate machinery or run household appliances.  Drink beer or alcoholic beverages.   Make important decisions or sign legal documents.    SPECIAL INSTRUCTIONS:   Diet: Clear liquid diet and advance diet as tolerated   Follow-up with Dr. Brown in 2-3 weeks for cystoscopy and stent removal.  Activity: Keep wounds clean and dry for 48 hours then OK to shower    Cystoscopy, Care After  Refer to this sheet in the next few weeks. These instructions provide you with information on caring for yourself after your procedure. Your caregiver may also give you more specific instructions. Your treatment has been planned according to current medical practices, but problems sometimes occur. Call your caregiver if you have any problems or questions after your procedure.  HOME CARE INSTRUCTIONS   Things you can do to ease any discomfort after your procedure include:  · Drinking enough water and fluids to keep your urine clear or pale yellow.  · Taking a warm bath to relieve any burning feelings.  SEEK IMMEDIATE MEDICAL CARE IF:   · You have an increase in blood in your urine.  · You notice blood clots in your urine.  · You have difficulty passing urine.  · You have the chills.  · You have abdominal pain.  · You have a fever or persistent symptoms for more than 2-3 days.  · You have a fever and your symptoms suddenly get worse.  MAKE SURE YOU:   · Understand these instructions.  · Will watch your condition.  · Will get help right away if you are not doing well or get worse.    DIET: To avoid nausea, slowly advance diet as tolerated,  avoiding spicy or greasy foods for the first day.  Add more substantial food to your diet according to your physician's instructions.  Babies can be fed formula or breast milk as soon as they are hungry.  INCREASE FLUIDS AND FIBER TO AVOID CONSTIPATION.    SURGICAL DRESSING/BATHING: See above instructions.    FOLLOW-UP APPOINTMENT:  A follow-up appointment should be arranged with your doctor in 2-3 weeks; call to schedule.    You should CALL YOUR PHYSICIAN if you develop:  Fever greater than 101 degrees F.  Pain not relieved by medication, or persistent nausea or vomiting.  Excessive bleeding (blood soaking through dressing) or unexpected drainage from the wound.  Extreme redness or swelling around the incision site, drainage of pus or foul smelling drainage.  Inability to urinate or empty your bladder within 8 hours.  Problems with breathing or chest pain.    You should call 911 if you develop problems with breathing or chest pain.  If you are unable to contact your doctor or surgical center, you should go to the nearest emergency room or urgent care center.  Physician's telephone #: Dr. Brown ([phone])    If any questions arise, call your doctor.  If your doctor is not available, please feel free to call the Surgical Center at [phone].  The Center is open Monday through Friday from 7AM to 7PM.  You can also call the [employer] HOTLINE open 24 hours/day, 7 days/week and speak to a nurse at [phone], or toll free at [phone].    A registered nurse may call you a few days after your surgery to see how you are doing after your procedure.    MEDICATIONS: Resume taking daily medication.  Take prescribed pain medication with food.  If no medication is prescribed, you may take non-aspirin pain medication if needed.  PAIN MEDICATION CAN BE VERY CONSTIPATING.  Take a stool softener or laxative such as senokot, pericolace, or milk of magnesia if needed.    Prescription given for Ryan Duggan.  Last  pain medication given at 3:35 PM ( Oxycodone 5 mg ). Next dose of Oxycodone due at __________.    If your physician has prescribed pain medication that includes Acetaminophen (Tylenol), do not take additional Acetaminophen (Tylenol) while taking the prescribed medication.    Depression / Suicide Risk    As you are discharged from this [facility] [place] [facility] facility, it is important to learn how to keep safe from harming yourself.    Recognize the warning signs:  · Abrupt changes in personality, positive or negative- including increase in energy   · Giving away possessions  · Change in eating patterns- significant weight changes-  positive or negative  · Change in sleeping patterns- unable to sleep or sleeping all the time   · Unwillingness or inability to communicate  · Depression  · Unusual sadness, discouragement and loneliness  · Talk of wanting to die  · Neglect of personal appearance   · Rebelliousness- reckless behavior  · Withdrawal from people/activities they love  · Confusion- inability to concentrate     If you or a loved one observes any of these behaviors or has concerns about self-harm, here's what you can do:  · Talk about it- your feelings and reasons for harming yourself  · Remove any means that you might use to hurt yourself (examples: pills, rope, extension cords, firearm)  · Get professional help from the community (Mental Health, Substance Abuse, psychological counseling)  · Do not be alone:Call your Safe Contact- someone whom you trust who will be there for you.  · Call your local CRISIS HOTLINE 480-0076 or 398-579-9770  · Call your local Children's Mobile Crisis Response Team [place] (267) 785-8084 or www.[employer]  · Call the toll free National Suicide Prevention Hotlines   · National Suicide Prevention Lifeline 936-996-GDYH (8159)  · National Hope Line Network 800-SUICIDE (424-1395)

## 2018-10-25 NOTE — OP REPORT
DATE OF SERVICE:  10/25/2018    PREOPERATIVE DIAGNOSES:  1.  Left nephrolithiasis.  2.  Indwelling left nephrostomy tube.  3.  Status post left percutaneous nephrolithotripsy for staghorn calculus.    OPERATIONS/PROCEDURES PERFORMED:  1.  Rigid cystourethroscopy.  2.  Left flexible ureteroscopy with holmium laser lithotripsy of upper and mid   pole caliceal stones.  3.  Left stent placement, 6-Sinhala x 24 cm.  4.  Removal of left nephrostomy tube.    SURGEON:  Manas Brown MD    ANESTHESIA:  General laryngeal mask.    POSTOPERATIVE DIAGNOSES:  1.  Left nephrolithiasis.  2.  Indwelling left nephrostomy tube.  3.  Status post left percutaneous nephrolithotripsy for staghorn calculus.    COMPLICATIONS:  None.    DRAINS:  6-Sinhala x 24 stent in the left ureter.    INDICATIONS:  The patient is a 48-year-old woman with a history of a staghorn   calculus.  She has undergone a percutaneous nephrolithotripsy but due to   angulation, I was unable to get into the mid pole andrzej and the upper pole   andrzej.  The patient presents at this admission for retrograde intrarenal   surgery with laser lithotripsy of the stones and stent placement with possible   nephrostomy tube removal.  Prior to surgery, I had a lengthy discussion with   the patient in the presence of her son and  regarding the risks of the   procedure including, but not limited to risk of urinary tract infection,   urosepsis, a risk of inability to treat all the stones requiring additional   procedures.  We also discussed the risk of stent migration, associated stent   pain, urgency, frequency and the fact that when the bandages removed, she will   leak urine for perhaps 12-24 hours after removal of nephrostomy tube and   application of bandage.  In addition, we discussed perioperative risk of deep   vein thrombosis, pulmonary embolism, aspiration pneumonia, heart attack,   stroke and death and ureteral perforation.  After discussion in the presence   of her  family, informed consent was given to me and she was marked in the   preoperative holding area on her left thigh with my initials and the letter Y.    Of note, the perioperative risks were also discussed, including deep vein   thrombosis.    DESCRIPTION IN DETAIL:  After informed consent was obtained and the patient   was marked in the preoperative holding area, she was brought to operating room   and placed in supine.  Bilateral sequential compression devices were in place   and activated.  General laryngeal mask anesthetic was administered in a   balanced fashion.  She received intravenous Ancef and positioned in modified   lithotomy.  The operative area was Betadine prepped and draped in usual   sterile fashion.  A timeout was called and all members of the operative team   agree as the patient's name, procedure to be performed without objections,   attention was directed towards procedure.  I passed a 22-Stateless rigid   cystoscope with 30-degree lens per urethra.  The urethra was normal.  The left   ureteral orifice is identified, has clear efflux.  The right ureteral orifice   has clear efflux.  I cannulated the left ureteral orifice with a 0.35 wire   and pushed this up into the kidney as documented in fluoroscopy.  Over the   wire, I passed the inner obturator to 11-Stateless tapered to 13 Stateless ureter   access sheath to dilate the ureteral orifice.  I then passed the flexible   digital ureteroscope, which is disposable over the wire and pushed it up to   the level of the kidney.  The ureter showed some fibrinous debris, but no   obvious stone.  Once I was in the kidney, I was able to evaluate and identify   the balloon, which was inflated with half contrast and half sterile water and   to the Bearden, which served as a nephrostomy tube and a Yerington type catheter   and the residual stone fragments.  I started in the upper pole moiety where   approximately a 1.5x2 cm stone was treated at a frequency of 40 Hz and  400   millijoules.  The stone was fragmented in numerous small pieces.  At the end   of treatment, there were no significant radiopaque components.  There was a   hint of stone fragments and they appeared nicely fragmented.  At this point in   time, I was able to negotiate into the mid pole andrzej, which was a narrow   infundibulum and went in and treated this 3x2 cm stone into numerous small   fragments.  I then irrigated the kidney briefly removing some of the debris   and at this point in time requested that the circulating nurse deflate the   balloon to the Crowell catheter functioning as a nephrostomy tube.  This   allowed me to visualize this area.  I did not see any residual stones, could   be in a missed andrzej and subsequently it appeared that fluoroscopically, all   stones have been fragmented nicely.  With the nephrostomy in place, I went   ahead and pushed a safety wire through the scope up in the kidney, withdrew   the flexible ureteroscope, switched off hydrodistention and backloaded the   wire into that cystoscope.  I then pushed up a 6-Luxembourger x 24 cm stent without   difficulty up into the mid pole or the mid renal pelvis.  When the guidewires   withdrawn, there was a good coil in the renal pelvis, a good coil in the   bladder.  At this point in time, the nephrostomy tube was removed.  She does   have a single staple in the back.  After removing the nephrostomy tube, a 4x4   bandage and Op-Site was applied for dressing.  The patient tolerated the   procedure well.  Her bladder was drained.  She was awakened in the operating   room and transferred to recovery room where she arrived in stable condition.       ____________________________________     MD TATYANA HAMM / KRANTHI    DD:  10/25/2018 15:48:32  DT:  10/25/2018 16:54:03    D#:  [id number]  Job#:  [id number]

## 2018-10-25 NOTE — OR NURSING
1455: received to PACU via [facility]. Sleepy. Respirations spontaneous.  1505: gauze and tegaderm dressing to left flank area with scant amount of light pink drainage.  1515:denies any pain.  1535: c/o left flank pain. Medicated. See MAR  1555: dressing to left flank area with moderate amount of pink drainage. Dressing changed  1600: report called to Naty GALVAN

## 2018-10-26 NOTE — OP REPORT
DATE OF SERVICE:  10/17/2018    PREOPERATIVE DIAGNOSIS:  Staghorn calculus.    OPERATIONS AND PROCEDURES PERFORMED:  1.  Left percutaneous nephrostomy tract dilation with NephroMax dilator.  2.  Left percutaneous nephrolithotripsy with ultrasonic lithotripter.  3.  Left nephrostogram.  4.  Left nephrostomy tube placement with 22-[place]izen [place] Sob[place] catheter.    SURGEON:  Manas Brown MD    ANESTHESIA:  General endotracheal.    ANESTHESIOLOGIST:  Rocky Elliott MD    POSTPERATIVE DIAGNOSIS:  Staghorn calculus.    COMPLICATIONS:  None.    DRAINS:  A 22-Citizen [place] Sob[place] catheter in the left kidney as nephrostomy tube.    INDICATIONS:  The patient is a 48-year-old woman with a staghorn calculus.    She has been counseled in the office as to the recommendation for percutaneous   nephrolithotripsy.  Prior to surgery, I explained to the patient that she   would have a nephroureteral catheter placed by interventional radiology, which   was accomplished earlier in the day.  We also discussed the risk of the   surgery prior to the procedure including, but not limited to a risk of   perioperative urinary tract infection, urosepsis, a risk of major bleeding,   risk of needing multiple procedures and I explained to the patient in [place] 3.5 operations are about the average number of procedure to render a   patient stone free with a staghorn calculus.  In addition, we discussed the   potential risk of disruption of the ureteropelvic junction, inability to treat   due to loss of access as well as the potential perioperative risk of deep   vein thrombosis, pulmonary embolism, aspiration pneumonia, heart attack,   stroke and death.  Informed consent was given to me by the patient to proceed   and she is marked in the preoperative holding on her left side, which is the   side where a nephroureteral catheter is draining.    DESCRIPTION IN DETAIL:  After informed consent was obtained, the patient had   previously been marked and  her films were reviewed.  She was brought to the   operating room and placed supine.  A general endotracheal anesthetic   administered in balanced fashion by Dr. Rocky Elliott.  Bilateral sequential   compression devices in place and a Bearden catheter was inserted.  The patient   was subsequently positioned prone.  She was appropriately padded.  Care was   taken to position her safely on the table.  Once she was safe on the table and   in stable anesthetic condition, attention was directed toward prepping the   flank.  The nephroureteral catheter was brought out through the neurosurgical   drainage drape and this was left in place.  At this point in time, I   originally passed a 0.35 ZIPwire down into the bladder as documented by   fluoroscopy.  I then withdrew the nephroureteral catheter and then passed the   dual-lumen ureteral access sheath into the kidney.  I was able to pass a   second wire, which did not go down the ureter, but coiled in the upper pole,   and over that wire, I dilated with a NephroMax dilator 28 Lao.  Half dilute   contrast was utilized.  Pressure was given until approximately 18 mmHg and   this was left in place for 3 minutes to tamponade any bleeding.  I then passed   the 28-Lao sheath over the balloon and advanced it to the level of the   stone.  I proceeded to withdraw the balloon and left the safety wire in place   and passed in the nephroscope.  Next, I was able to identify the stone.  I   withdrew the second wire and had a safety wire down in through the   ureteropelvic junction of the bladder.  With hydrodistention, I was able to   engage the stone with the ultrasonic lithotripter and treated the stone for   several hours.  I was able to treat the majority of the stone with the   exception of an upper pole andrzej, which I could not angle into and then there   was an infundibular area that was very tight, and because of the 90-degree   angle, I could not get back into this area, and  due to the length of treatment   and some visualization deteriorating, I elected to leave these stones in   place and proceed with nephrostomy tube.  At the completion of the treatment,   there was approximately a 2.5 cm fragment in the upper pole andrzej, a 2-2.5 cm   fragment in the mid pole andrzej and the vast majority of this very large   staghorn had been removed.  At this point in time, with the safety wire in   place, I withdrew the nephroscope.  I proceeded to perform a   nephroureterogram, which showed drainage down the ureter.  There was no   significant extravasation, and subsequently, I passed a 22-[place]ill   catheter over the wire, advanced it into the renal pelvis, inflated the   balloon with 3 mL of half dilute Cysto-Conray and sterile water and then   perform another nephrostogram, which showed evidence of no significant   extravasation.  Contrast was going down the ureter.  There was some blood clot   and debris noted in the previously mentioned stones, which were not treated   were identified.  At this point in time, the patient had tolerated this well.    She subsequently was flipped from a prone position to a supine position and   she was awake in the operating room and transferred to recovery room where she   arrived in stable condition.  Her Bearden catheter was removed and she had   stable vitals after surgery in the recovery room with anticipation for   hospital discharge.       ____________________________________     MD TATYANA HAMM / KRANTHI    DD:  10/25/2018 16:26:22  DT:  10/25/2018 17:02:52    D#:  [id number]  Job#:  [id number]

## 2021-05-17 ENCOUNTER — HOSPITAL ENCOUNTER (OUTPATIENT)
Dept: LAB | Facility: MEDICAL CENTER | Age: 51
End: 2021-05-17
Attending: STUDENT IN AN ORGANIZED HEALTH CARE EDUCATION/TRAINING PROGRAM
Payer: COMMERCIAL

## 2021-05-17 LAB
ALBUMIN SERPL BCP-MCNC: 4.3 G/DL (ref 3.2–4.9)
ALBUMIN/GLOB SERPL: 1.2 G/DL
ALP SERPL-CCNC: 94 U/L (ref 30–99)
ALT SERPL-CCNC: 21 U/L (ref 2–50)
ANION GAP SERPL CALC-SCNC: 13 MMOL/L (ref 7–16)
AST SERPL-CCNC: 12 U/L (ref 12–45)
BILIRUB SERPL-MCNC: 0.5 MG/DL (ref 0.1–1.5)
BUN SERPL-MCNC: 10 MG/DL (ref 8–22)
CALCIUM SERPL-MCNC: 10.1 MG/DL (ref 8.5–10.5)
CHLORIDE SERPL-SCNC: 104 MMOL/L (ref 96–112)
CO2 SERPL-SCNC: 23 MMOL/L (ref 20–33)
CREAT SERPL-MCNC: 0.65 MG/DL (ref 0.5–1.4)
EST. AVERAGE GLUCOSE BLD GHB EST-MCNC: 120 MG/DL
GLOBULIN SER CALC-MCNC: 3.5 G/DL (ref 1.9–3.5)
GLUCOSE SERPL-MCNC: 86 MG/DL (ref 65–99)
HBA1C MFR BLD: 5.8 % (ref 4–5.6)
POTASSIUM SERPL-SCNC: 3.7 MMOL/L (ref 3.6–5.5)
PROT SERPL-MCNC: 7.8 G/DL (ref 6–8.2)
SODIUM SERPL-SCNC: 140 MMOL/L (ref 135–145)
TSH SERPL DL<=0.005 MIU/L-ACNC: 2.1 UIU/ML (ref 0.38–5.33)

## 2021-05-17 PROCEDURE — 84443 ASSAY THYROID STIM HORMONE: CPT

## 2021-05-17 PROCEDURE — 83036 HEMOGLOBIN GLYCOSYLATED A1C: CPT

## 2021-05-17 PROCEDURE — 80053 COMPREHEN METABOLIC PANEL: CPT

## 2021-05-17 PROCEDURE — 36415 COLL VENOUS BLD VENIPUNCTURE: CPT

## 2021-07-28 ENCOUNTER — HOSPITAL ENCOUNTER (OUTPATIENT)
Dept: CARDIOLOGY | Facility: MEDICAL CENTER | Age: 51
End: 2021-07-28
Attending: STUDENT IN AN ORGANIZED HEALTH CARE EDUCATION/TRAINING PROGRAM
Payer: COMMERCIAL

## 2021-07-28 DIAGNOSIS — R01.1 HEART MURMUR: ICD-10-CM

## 2021-07-28 LAB
LV EJECT FRACT  99904: 65
LV EJECT FRACT MOD 2C 99903: 57.95
LV EJECT FRACT MOD 4C 99902: 68.99
LV EJECT FRACT MOD BP 99901: 63.06

## 2021-07-28 PROCEDURE — 93306 TTE W/DOPPLER COMPLETE: CPT | Mod: 26 | Performed by: INTERNAL MEDICINE

## 2021-07-28 PROCEDURE — 93306 TTE W/DOPPLER COMPLETE: CPT

## (undated) DEVICE — SYRINGE TOOMEY (50EA/CA)

## (undated) DEVICE — TOWELS CLOTH SURGICAL - (4/PK 20PK/CA)

## (undated) DEVICE — GOWN WARMING STANDARD FLEX - (30/CA)

## (undated) DEVICE — SUTURE GENERAL

## (undated) DEVICE — WATER IRRIG. STER. 1500 ML - (9/CA)

## (undated) DEVICE — ZIPWIRE .038 STRAIGHT BENTSON TIP (5EA/BX)

## (undated) DEVICE — HEAD HOLDER JUNIOR/ADULT

## (undated) DEVICE — GLOVE BIOGEL PI INDICATOR SZ 6.5 SURGICAL PF LF - (50/BX 4BX/CA)

## (undated) DEVICE — CATHETER URETHRAL OPEN END AXXCESS (10EA/BX)

## (undated) DEVICE — GOWN SURGEONS X-LARGE - DISP. (30/CA)

## (undated) DEVICE — SUCTION INSTRUMENT YANKAUER BULBOUS TIP W/O VENT (50EA/CA)

## (undated) DEVICE — SYRINGE 30 ML LL (56/BX)

## (undated) DEVICE — TUBE E-T HI-LO CUFF 7.0MM (10EA/PK)

## (undated) DEVICE — SCOPE DIGITAL URETEROSCOPE DISPOSABLE

## (undated) DEVICE — KIT ROOM DECONTAMINATION

## (undated) DEVICE — CATHETER URET OPEN END 6FR (10EA/BX)

## (undated) DEVICE — MASK ANESTHESIA ADULT  - (100/CA)

## (undated) DEVICE — GLOVE BIOGEL PI INDICATOR SZ 7.5 SURGICAL PF LF -(50/BX 4BX/CA)

## (undated) DEVICE — Device

## (undated) DEVICE — POUCH FLUID COLLECTION INVISISHIELD - (10/BX)

## (undated) DEVICE — TUBE CONNECT SUCTION CLEAR 120 X 1/4" (50EA/CA)"

## (undated) DEVICE — SENSOR SPO2 NEO LNCS ADHESIVE (20/BX) SEE USER NOTES

## (undated) DEVICE — WATER IRRIG. STER 3000 ML - (4/CA)

## (undated) DEVICE — GLOVE BIOGEL INDICATOR SZ 7SURGICAL PF LTX - (50/BX 4BX/CA)

## (undated) DEVICE — SODIUM CHL. IRRIGATION 0.9% 3000ML (4EA/CA 65CA/PF)

## (undated) DEVICE — CHLORAPREP 26 ML APPLICATOR - ORANGE TINT(25/CA)

## (undated) DEVICE — BANDAGE ROLL STERILE BULKEE 4.5 IN X 4 YD (100EA/CA)

## (undated) DEVICE — CATHETER BALLOON NEPHROMAX

## (undated) DEVICE — ELECTRODE 850 FOAM ADHESIVE - HYDROGEL RADIOTRNSPRNT (50/PK)

## (undated) DEVICE — TUBING CLEARLINK DUO-VENT - C-FLO (48EA/CA)

## (undated) DEVICE — PROTECTOR ULNA NERVE - (36PR/CA)

## (undated) DEVICE — ARMREST CRADLE FOAM - (2PR/PK 12PR/CA)

## (undated) DEVICE — KIT ANESTHESIA W/CIRCUIT & 3/LT BAG W/FILTER (20EA/CA)

## (undated) DEVICE — SLEEVE, VASO, THIGH, MED

## (undated) DEVICE — LACTATED RINGERS INJ 1000 ML - (14EA/CA 60CA/PF)

## (undated) DEVICE — PACK MINOR BASIN - (2EA/CA)

## (undated) DEVICE — GLOVE BIOGEL SZ 7.5 SURGICAL PF LTX - (50PR/BX 4BX/CA)

## (undated) DEVICE — SET IRRIGATION CYSTOSCOPY Y-TYPE L81 IN (20EA/CA)

## (undated) DEVICE — DRAPE LARGE 3 QUARTER - (20/CA)

## (undated) DEVICE — ELECTRODE DUAL RETURN W/ CORD - (50/PK)

## (undated) DEVICE — LASER TRAC TIP 200 MIRCON FOR 100 WATT LASER

## (undated) DEVICE — PACK CYSTOSCOPY III - (8/CA)

## (undated) DEVICE — PROBE ULTRASOUND DISP 3.8X330

## (undated) DEVICE — MASK, LARYNGEAL AIRWAY #4

## (undated) DEVICE — SET EXTENSION WITH 2 PORTS (48EA/CA) ***PART #2C8610 IS A SUBSTITUTE*****

## (undated) DEVICE — SUTURE 0 SILK 12 X 18 (36PK/BX)

## (undated) DEVICE — CONTAINER SPECIMEN BAG OR - STERILE 4 OZ W/LID (100EA/CA)

## (undated) DEVICE — GOWN SURGEONS LARGE - (32/CA)

## (undated) DEVICE — BAG URODRAIN WITH TUBING - (20/CA)

## (undated) DEVICE — CATHETER URET DUAL LUMEN

## (undated) DEVICE — SUTURE 2-0 SILK FS (12EA/BX)

## (undated) DEVICE — SPONGE GAUZESTER 4 X 4 4PLY - (128PK/CA)

## (undated) DEVICE — GLOVE BIOGEL SZ 6.5 SURGICAL PF LTX (50PR/BX 4BX/CA)

## (undated) DEVICE — CONNECTOR HOSE NEPTUNE FOR CYSTO ROOM

## (undated) DEVICE — CANISTER SUCTION 3000ML MECHANICAL FILTER AUTO SHUTOFF MEDI-VAC NONSTERILE LF DISP  (40EA/CA)

## (undated) DEVICE — SODIUM CHL IRRIGATION 0.9% 1000ML (12EA/CA)

## (undated) DEVICE — SYRINGE DISP. 12 CC LL - (100/BX)

## (undated) DEVICE — DRAPE T CRANIOTOMY W/POUCH - (9/CA)

## (undated) DEVICE — SET LEADWIRE 5 LEAD BEDSIDE DISPOSABLE ECG (1SET OF 5/EA)

## (undated) DEVICE — DRAPE C-ARM LARGE 41IN X 74 IN - (10/BX 2BX/CA)

## (undated) DEVICE — SYRINGE 20 ML LL (50EA/BX 4BX/CA)

## (undated) DEVICE — NEPTUNE 4 PORT MANIFOLD - (20/PK)

## (undated) DEVICE — JELLY, KY 2 0Z STERILE

## (undated) DEVICE — SUCTION INSTRUMENT YANKAUER OPEN TIP W/O VENT (50EA/CA)

## (undated) DEVICE — GOWN SURGICAL X-LARGE ULTRA - FILM-REINFORCED (20/CA)

## (undated) DEVICE — COVER FOOT UNIVERSAL DISP. - (25EA/CA)

## (undated) DEVICE — BAG DRAINAGE URINARY CLOSED 2000ML (20EA/CA)

## (undated) DEVICE — PACK SINGLE BASIN - (6/CA)